# Patient Record
Sex: FEMALE | Race: WHITE
[De-identification: names, ages, dates, MRNs, and addresses within clinical notes are randomized per-mention and may not be internally consistent; named-entity substitution may affect disease eponyms.]

---

## 2019-02-28 ENCOUNTER — HOSPITAL ENCOUNTER (OUTPATIENT)
Dept: HOSPITAL 91 - GIL | Age: 54
Discharge: HOME | End: 2019-02-28
Payer: COMMERCIAL

## 2019-02-28 ENCOUNTER — HOSPITAL ENCOUNTER (OUTPATIENT)
Dept: HOSPITAL 10 - GIL | Age: 54
Discharge: HOME | End: 2019-02-28
Attending: INTERNAL MEDICINE
Payer: COMMERCIAL

## 2019-02-28 VITALS
HEIGHT: 61 IN | BODY MASS INDEX: 31.26 KG/M2 | WEIGHT: 165.57 LBS | BODY MASS INDEX: 31.26 KG/M2 | WEIGHT: 165.57 LBS | HEIGHT: 61 IN

## 2019-02-28 VITALS — RESPIRATION RATE: 20 BRPM | SYSTOLIC BLOOD PRESSURE: 138 MMHG | DIASTOLIC BLOOD PRESSURE: 84 MMHG | HEART RATE: 84 BPM

## 2019-02-28 VITALS — DIASTOLIC BLOOD PRESSURE: 81 MMHG | RESPIRATION RATE: 20 BRPM | SYSTOLIC BLOOD PRESSURE: 135 MMHG

## 2019-02-28 DIAGNOSIS — E78.5: ICD-10-CM

## 2019-02-28 DIAGNOSIS — D12.3: ICD-10-CM

## 2019-02-28 DIAGNOSIS — Z12.11: Primary | ICD-10-CM

## 2019-02-28 DIAGNOSIS — E66.9: ICD-10-CM

## 2019-02-28 DIAGNOSIS — D12.0: ICD-10-CM

## 2019-02-28 PROCEDURE — 45380 COLONOSCOPY AND BIOPSY: CPT

## 2019-02-28 PROCEDURE — 88305 TISSUE EXAM BY PATHOLOGIST: CPT

## 2019-02-28 NOTE — PREAC
Date/Time of Note


Date/Time of Note


DATE: 2/28/19 


TIME: 14:06





Anesthesia Eval and Record


Evaluation


Time Pre-Procedure Interview


DATE: 2/28/19 


TIME: 14:06


Age


53


Sex


female


NPO:  8 hrs


Preoperative diagnosis


screening for colon cancer


Planned procedure


colonoscopy





Past Medical History


Past Medical History:  Includes


Cardio:  Dyslipidemia


GI:  Obesity (bmi 31)





Surgery & Anesthesia Issues


No known issue





Meds


Anticoagulation:  No


Beta Blocker within 24 hr:  No


Reason Beta Blocker not given:  Pt. not on B-Blocker


Reported Medications


[Atorvastatin]   No Conflict Check


   2/28/19


Meds reviewed:  Yes





Allergies


Coded Allergies:  


     No Known Allergy (Unverified  Allergy, Unknown, 12/26/06)


Allergies Reviewed:  Yes





Labs/Studies


Labs Reviewed:  Reviewed by anesthesiologist


Pregnancy test:  N/A





Pre-procedure Exam


Airway:  Adequate mouth opening, Adequate thyromental dist


Mallampati:  Mallampati II


Teeth:  Normal


Lung:  Normal


Heart:  Normal





ASA Physical Status


ASA physical status:  2


Emergency:  None





Planned Anesthetic


General/MAC:  MAC





Planned Pain Management


Parenteral pain med





Pre-operative Attestations


Prior to commencing anesthesia and surgery, the patient was re-evaluated, there 


was verification of:


*The patient's identity


*The results of appropriate recent lab work and preoperative vital signs


*The above evaluation not changing prior to induction


*Anesthetic plan, risk benefits, alternative and complications discussed with 


patient/family; questions answered; patient/family understands, accepts and 


wishes to proceed.











ANTONIA FIGUEREDO                  Feb 28, 2019 14:08

## 2019-02-28 NOTE — HPN
Date/Time of Note


Date/Time of Note


DATE: 2/28/19 


TIME: 14:43





Interval H&P Admission Note


Pt. seen H&P reviewed:  No system changes











JORGE ORDAZ                     Feb 28, 2019 14:43